# Patient Record
Sex: FEMALE | Race: WHITE | URBAN - METROPOLITAN AREA
[De-identification: names, ages, dates, MRNs, and addresses within clinical notes are randomized per-mention and may not be internally consistent; named-entity substitution may affect disease eponyms.]

---

## 2019-11-05 ENCOUNTER — Encounter (OUTPATIENT)
Dept: URBAN - METROPOLITAN AREA CLINIC 71 | Facility: CLINIC | Age: 59
End: 2019-11-05
Payer: COMMERCIAL

## 2019-11-05 PROCEDURE — 92250 FUNDUS PHOTOGRAPHY W/I&R: CPT | Performed by: OPHTHALMOLOGY

## 2019-11-05 PROCEDURE — 99204 OFFICE O/P NEW MOD 45 MIN: CPT | Performed by: OPHTHALMOLOGY

## 2019-12-03 ENCOUNTER — Encounter (OUTPATIENT)
Dept: URBAN - METROPOLITAN AREA CLINIC 71 | Facility: CLINIC | Age: 59
End: 2019-12-03
Payer: COMMERCIAL

## 2019-12-03 PROCEDURE — 92014 COMPRE OPH EXAM EST PT 1/>: CPT | Performed by: OPHTHALMOLOGY

## 2019-12-06 ENCOUNTER — FOLLOW UP ESTABLISHED (OUTPATIENT)
Dept: URBAN - METROPOLITAN AREA CLINIC 70 | Facility: CLINIC | Age: 59
End: 2019-12-06
Payer: COMMERCIAL

## 2019-12-06 DIAGNOSIS — H25.13 AGE-RELATED NUCLEAR CATARACT, BILATERAL: ICD-10-CM

## 2019-12-06 PROCEDURE — 92014 COMPRE OPH EXAM EST PT 1/>: CPT | Performed by: OPHTHALMOLOGY

## 2019-12-06 PROCEDURE — 92134 CPTRZ OPH DX IMG PST SGM RTA: CPT | Performed by: OPHTHALMOLOGY

## 2019-12-06 ASSESSMENT — INTRAOCULAR PRESSURE
OS: 13
OD: 15

## 2020-05-27 ENCOUNTER — OFFICE VISIT (OUTPATIENT)
Dept: URBAN - METROPOLITAN AREA CLINIC 71 | Facility: CLINIC | Age: 60
End: 2020-05-27
Payer: COMMERCIAL

## 2020-05-27 DIAGNOSIS — H43.811 VITREOUS DEGENERATION, RIGHT EYE: Primary | ICD-10-CM

## 2020-05-27 PROCEDURE — 92014 COMPRE OPH EXAM EST PT 1/>: CPT | Performed by: OPHTHALMOLOGY

## 2020-05-27 PROCEDURE — 92250 FUNDUS PHOTOGRAPHY W/I&R: CPT | Performed by: OPHTHALMOLOGY

## 2020-05-27 ASSESSMENT — KERATOMETRY
OD: 38.50
OS: 40.13

## 2020-05-27 ASSESSMENT — INTRAOCULAR PRESSURE
OS: 15
OD: 14

## 2020-05-27 NOTE — IMPRESSION/PLAN
Impression: Combined forms of age-related cataract, bilateral: H25.813. Stable. Plan: Cataracts do not require treatment unless they interfere with vision and impact one's activities of daily living, in which case cataract extraction with lens implant insertion should be performed. Discussed with patient the option of cataract surgery, but informed that it would not take away the complaints of the floaters. Would like to hold off for 6 months and recheck with dilation.

## 2020-05-27 NOTE — IMPRESSION/PLAN
Impression: Vitreous degeneration, right eye: H43.811. Plan: The PVD is stable, and there is no evidence of a retinal tear or detachment on dilated exam.  I again reviewed the signs and symptoms of a retinal tear and detachment in detail with the patient, including worsening flashes, new floaters, and development of a shadow/curtain in the peripheral visual field. The patient was advised to call immediately with any changes to [address] or increase in symptoms. Discussed with patient that floaters are the likely cause of visual disturbance that has been coming and going for patient. Patient's floaters are more of a cloud like [address] Informed that typically in 3-6 months it should dissipate, but not a guarantee that they will go away.

## 2021-03-18 ENCOUNTER — OFFICE VISIT (OUTPATIENT)
Dept: URBAN - METROPOLITAN AREA CLINIC 71 | Facility: CLINIC | Age: 61
End: 2021-03-18
Payer: COMMERCIAL

## 2021-03-18 DIAGNOSIS — H25.813 COMBINED FORMS OF AGE-RELATED CATARACT, BILATERAL: Primary | ICD-10-CM

## 2021-03-18 DIAGNOSIS — H52.4 PRESBYOPIA: ICD-10-CM

## 2021-03-18 DIAGNOSIS — H43.813 VITREOUS DEGENERATION, BILATERAL: ICD-10-CM

## 2021-03-18 DIAGNOSIS — H35.372 PUCKERING OF MACULA, LEFT EYE: ICD-10-CM

## 2021-03-18 PROCEDURE — 92134 CPTRZ OPH DX IMG PST SGM RTA: CPT | Performed by: OPHTHALMOLOGY

## 2021-03-18 PROCEDURE — 92014 COMPRE OPH EXAM EST PT 1/>: CPT | Performed by: OPHTHALMOLOGY

## 2021-03-18 ASSESSMENT — INTRAOCULAR PRESSURE
OS: 18
OD: 18

## 2021-03-18 ASSESSMENT — VISUAL ACUITY
OS: 20/20
OD: 20/20

## 2021-03-18 NOTE — IMPRESSION/PLAN
Impression: Combined forms of age-related cataract, bilateral: H25.813. Worsening Plan: Discussed with patient. Discussed treatment options. Discussed accuracy of calculations after LASIK patient. Recommend doing OS first, being the near eye. Discussed R/B/A and restrictions.  Pt elects to proceed with cataract surgery OU

## 2021-03-18 NOTE — IMPRESSION/PLAN
Impression: Vitreous degeneration, bilateral: H43.813. Stable Plan: Discussed.  Will continue to observe

## 2021-04-28 ENCOUNTER — PRE-OPERATIVE VISIT (OUTPATIENT)
Dept: URBAN - METROPOLITAN AREA CLINIC 71 | Facility: CLINIC | Age: 61
End: 2021-04-28
Payer: COMMERCIAL

## 2021-04-28 DIAGNOSIS — H25.811 COMBINED FORMS OF AGE-RELATED CATARACT, RIGHT EYE: ICD-10-CM

## 2021-04-28 PROCEDURE — 92012 INTRM OPH EXAM EST PATIENT: CPT | Performed by: OPHTHALMOLOGY

## 2021-04-28 PROCEDURE — 76519 ECHO EXAM OF EYE: CPT | Performed by: OPHTHALMOLOGY

## 2021-04-28 NOTE — IMPRESSION/PLAN
Impression: Combined forms of age-related cataract, bilateral: H25.813. Plan: Discussed diagnosis in detail with patient. Surgical treatment is  necessary to improve vision Patient elects to have surgery. Surgical risks and benefits were discussed, explained and understood by patient. Lid scrubs and hygiene were explained. Pre op instructions given and understood. Post op instructions given and understood. Continue using current medication(s). PROCEED WITH CUSTOM CARE REFRACTIVE SURGERY RIGHT EYE FOR DISTANCE, LEFT EYE FOR NEAR.  LASIK OU,  RX DROPS, NO DEXYCU, CS VIDEO DONE

## 2022-11-10 ENCOUNTER — OFFICE VISIT (OUTPATIENT)
Dept: URBAN - METROPOLITAN AREA CLINIC 71 | Facility: CLINIC | Age: 62
End: 2022-11-10
Payer: COMMERCIAL

## 2022-11-10 DIAGNOSIS — H02.889 MEIBOMIAN GLAND DYSFUNCTION OF EYE: ICD-10-CM

## 2022-11-10 DIAGNOSIS — H43.813 VITREOUS DEGENERATION, BILATERAL: ICD-10-CM

## 2022-11-10 DIAGNOSIS — H25.813 COMBINED FORMS OF AGE-RELATED CATARACT, BILATERAL: Primary | ICD-10-CM

## 2022-11-10 DIAGNOSIS — H04.123 DRY EYE SYNDROME OF BILATERAL LACRIMAL GLANDS: ICD-10-CM

## 2022-11-10 DIAGNOSIS — H52.4 PRESBYOPIA: ICD-10-CM

## 2022-11-10 PROCEDURE — 99204 OFFICE O/P NEW MOD 45 MIN: CPT

## 2022-11-10 PROCEDURE — 92015 DETERMINE REFRACTIVE STATE: CPT

## 2022-11-10 PROCEDURE — 92134 CPTRZ OPH DX IMG PST SGM RTA: CPT

## 2022-11-10 ASSESSMENT — VISUAL ACUITY
OS: 20/20
OD: 20/25

## 2022-11-10 ASSESSMENT — INTRAOCULAR PRESSURE
OS: 15
OD: 16

## 2022-11-10 NOTE — IMPRESSION/PLAN
Impression: Presbyopia: H52.4. Plan: No glasses RX dispensed.  PT to return to Dr Justyn Obrien for 4wk PO w/ ref

## 2022-11-10 NOTE — IMPRESSION/PLAN
Impression: Combined forms of age-related cataract, bilateral: H25.813. OCT ordered and done today PT to return to Dr Lorenza Pace No retinal clearance recommended Dr Sussy Dash, OS then OD Plan: Cataracts account for the patient's complaints. Discussed all risks, benefits, procedures and recovery. Patient understands changing glasses will not improve vision. Patient desires to have surgery, recommend phacoemulsification with intraocular lens.

## 2022-12-22 ENCOUNTER — PRE-OPERATIVE VISIT (OUTPATIENT)
Dept: URBAN - METROPOLITAN AREA CLINIC 71 | Facility: CLINIC | Age: 62
End: 2022-12-22
Payer: COMMERCIAL

## 2022-12-22 DIAGNOSIS — H25.813 COMBINED FORMS OF AGE-RELATED CATARACT, BILATERAL: Primary | ICD-10-CM

## 2023-02-14 ENCOUNTER — PRE-OPERATIVE VISIT (OUTPATIENT)
Dept: URBAN - METROPOLITAN AREA CLINIC 71 | Facility: CLINIC | Age: 63
End: 2023-02-14
Payer: MEDICARE

## 2023-02-14 DIAGNOSIS — H43.813 VITREOUS DEGENERATION, BILATERAL: ICD-10-CM

## 2023-02-14 DIAGNOSIS — H35.372 PUCKERING OF MACULA, LEFT EYE: ICD-10-CM

## 2023-02-14 DIAGNOSIS — H25.813 COMBINED FORMS OF AGE-RELATED CATARACT, BILATERAL: Primary | ICD-10-CM

## 2023-02-14 PROCEDURE — 99213 OFFICE O/P EST LOW 20 MIN: CPT | Performed by: OPHTHALMOLOGY

## 2023-02-14 RX ORDER — KETOROLAC TROMETHAMINE 5 MG/ML
0.5 % SOLUTION OPHTHALMIC
Qty: 5 | Refills: 1 | Status: ACTIVE
Start: 2023-02-14

## 2023-02-14 ASSESSMENT — INTRAOCULAR PRESSURE
OD: 14
OS: 16

## 2023-02-14 ASSESSMENT — VISUAL ACUITY
OS: 20/25
OD: 20/25

## 2023-02-14 NOTE — IMPRESSION/PLAN
Impression: Combined forms of age-related cataract, bilateral: H25.813. Plan: Reviewed Chestine Kamar, OCT & Optos. Heart and lungs clear. R/B/A discussed. Proceed with Forever young cataract surgery with dextenza. OS first for distance then OD for distance. History of LASIK OU. Patient voices understanding that cataract surgery is not a guarantee for 20/20 vision and that glasses may be needed after the surgery. No clearance needed per Dr. Leobardo Duverney. 
Start ketorolac BID for 3 weeks starting the day prior to surgery on the surgical eye.

## 2023-02-14 NOTE — IMPRESSION/PLAN
Impression: Vitreous degeneration, bilateral: H43.813. Plan: Optos ordered, no sign of any retinal holes or tears noted.

## 2023-03-23 ENCOUNTER — POST-OPERATIVE VISIT (OUTPATIENT)
Dept: URBAN - METROPOLITAN AREA CLINIC 71 | Facility: CLINIC | Age: 63
End: 2023-03-23
Payer: MEDICARE

## 2023-03-23 DIAGNOSIS — Z48.810 ENCOUNTER FOR SURGICAL AFTERCARE FOLLOWING SURGERY ON A SENSE ORGAN: Primary | ICD-10-CM

## 2023-03-23 PROCEDURE — 99024 POSTOP FOLLOW-UP VISIT: CPT

## 2023-03-23 ASSESSMENT — VISUAL ACUITY: OS: 20/20

## 2023-03-23 ASSESSMENT — INTRAOCULAR PRESSURE
OS: 10
OD: 10

## 2023-03-27 ENCOUNTER — SURGERY (OUTPATIENT)
Dept: URBAN - METROPOLITAN AREA SURGERY 45 | Facility: SURGERY | Age: 63
End: 2023-03-27
Payer: MEDICARE

## 2023-03-28 ENCOUNTER — POST-OPERATIVE VISIT (OUTPATIENT)
Dept: URBAN - METROPOLITAN AREA CLINIC 71 | Facility: CLINIC | Age: 63
End: 2023-03-28
Payer: MEDICARE

## 2023-03-28 DIAGNOSIS — Z96.1 PRESENCE OF INTRAOCULAR LENS: Primary | ICD-10-CM

## 2023-03-28 PROCEDURE — 99024 POSTOP FOLLOW-UP VISIT: CPT

## 2023-03-28 ASSESSMENT — INTRAOCULAR PRESSURE
OS: 9
OD: 14

## 2023-03-28 NOTE — IMPRESSION/PLAN
Impression: S/P Cataract Extraction by phacoemulsification with IOL placement; Femto (LenSx); ORA OD - 1 Day. Presence of intraocular lens  Z96.1. Excellent post op course. Post operative instructions reviewed - Pt assessed by Dr. Krunal Fuentes today under the supervision of Dr. Jyothi Tsai. Plan: Continue Ketorolac BID as directed for a total of three weeks. Can use ATs for comfort as needed. Pt to call with an questions or concerns. Will have pt return for 4 week PO w/refraction.

## 2023-04-25 ENCOUNTER — POST-OPERATIVE VISIT (OUTPATIENT)
Dept: URBAN - METROPOLITAN AREA CLINIC 71 | Facility: CLINIC | Age: 63
End: 2023-04-25
Payer: MEDICARE

## 2023-04-25 DIAGNOSIS — Z96.1 PRESENCE OF INTRAOCULAR LENS: Primary | ICD-10-CM

## 2023-04-25 PROCEDURE — 99024 POSTOP FOLLOW-UP VISIT: CPT

## 2023-04-25 ASSESSMENT — INTRAOCULAR PRESSURE
OS: 12
OD: 12

## 2023-04-25 NOTE — IMPRESSION/PLAN
Impression: S/P Cataract Extraction by phacoemulsification with IOL placement; Femto (LenSx); ORA OD - 29 Days. Presence of intraocular lens  Z96.1. Excellent post-op course. Condition is improving OU. Plan: Discussed dry eyes/MGD. Recommend pt use warm compresses multiple times per day. Use lubricating drops up t QID OU as needed for comfort. Discussed good lid hygiene. No new glasses Rx was generated or given to pt today, per pt request. Pt to call with any questions or concerns, or if she would like to schedule a refraction.

## 2023-09-21 NOTE — IMPRESSION/PLAN
Impression: Puckering of macula, left eye: H35.372. New Dx.  Trace Plan: Continue to observe Doxycycline Pregnancy And Lactation Text: This medication is Pregnancy Category D and not consider safe during pregnancy. It is also excreted in breast milk but is considered safe for shorter treatment courses.

## 2023-10-26 ENCOUNTER — OFFICE VISIT (OUTPATIENT)
Dept: URBAN - METROPOLITAN AREA CLINIC 71 | Facility: CLINIC | Age: 63
End: 2023-10-26
Payer: MEDICARE

## 2023-10-26 DIAGNOSIS — H26.493 OTHER SECONDARY CATARACT, BILATERAL: ICD-10-CM

## 2023-10-26 DIAGNOSIS — Z96.1 PRESENCE OF INTRAOCULAR LENS: ICD-10-CM

## 2023-10-26 DIAGNOSIS — H43.813 VITREOUS DEGENERATION, BILATERAL: ICD-10-CM

## 2023-10-26 DIAGNOSIS — H53.10 UNSPECIFIED SUBJECTIVE VISUAL DISTURBANCES: Primary | ICD-10-CM

## 2023-10-26 PROCEDURE — 99213 OFFICE O/P EST LOW 20 MIN: CPT

## 2023-10-26 ASSESSMENT — INTRAOCULAR PRESSURE
OD: 14
OS: 14

## 2023-12-13 ENCOUNTER — OFFICE VISIT (OUTPATIENT)
Dept: URBAN - METROPOLITAN AREA CLINIC 72 | Facility: CLINIC | Age: 63
End: 2023-12-13
Payer: MEDICARE

## 2023-12-13 DIAGNOSIS — H53.40 VISUAL FIELD DEFECT: Primary | ICD-10-CM

## 2023-12-13 DIAGNOSIS — H53.19 VISUAL HALO: ICD-10-CM

## 2023-12-13 PROCEDURE — 92083 EXTENDED VISUAL FIELD XM: CPT

## 2023-12-13 PROCEDURE — 92133 CPTRZD OPH DX IMG PST SGM ON: CPT

## 2023-12-13 PROCEDURE — 99213 OFFICE O/P EST LOW 20 MIN: CPT

## 2023-12-13 ASSESSMENT — INTRAOCULAR PRESSURE
OS: 14
OD: 15

## 2024-01-09 ENCOUNTER — OFFICE VISIT (OUTPATIENT)
Facility: LOCATION | Age: 64
End: 2024-01-09
Payer: MEDICARE

## 2024-01-09 DIAGNOSIS — Z96.1 PRESENCE OF PSEUDOPHAKIA: ICD-10-CM

## 2024-01-09 DIAGNOSIS — H43.813 BILATERAL VITREOUS DETACHMENT OF EYES: ICD-10-CM

## 2024-01-09 PROCEDURE — 92134 CPTRZ OPH DX IMG PST SGM RTA: CPT | Performed by: STUDENT IN AN ORGANIZED HEALTH CARE EDUCATION/TRAINING PROGRAM

## 2024-01-09 PROCEDURE — 99204 OFFICE O/P NEW MOD 45 MIN: CPT | Performed by: STUDENT IN AN ORGANIZED HEALTH CARE EDUCATION/TRAINING PROGRAM

## 2024-01-09 ASSESSMENT — INTRAOCULAR PRESSURE
OS: 12
OD: 15